# Patient Record
Sex: FEMALE | Race: BLACK OR AFRICAN AMERICAN | NOT HISPANIC OR LATINO | URBAN - METROPOLITAN AREA
[De-identification: names, ages, dates, MRNs, and addresses within clinical notes are randomized per-mention and may not be internally consistent; named-entity substitution may affect disease eponyms.]

---

## 2023-04-04 ENCOUNTER — AMBULATORY SURGICAL CENTER (OUTPATIENT)
Dept: URBAN - METROPOLITAN AREA SURGERY 3 | Facility: SURGERY | Age: 68
End: 2023-04-04
Payer: COMMERCIAL

## 2023-04-04 ENCOUNTER — OFFICE (OUTPATIENT)
Dept: URBAN - METROPOLITAN AREA PATHOLOGY 12 | Facility: PATHOLOGY | Age: 68
End: 2023-04-04
Payer: COMMERCIAL

## 2023-04-04 VITALS
OXYGEN SATURATION: 98 % | SYSTOLIC BLOOD PRESSURE: 148 MMHG | HEART RATE: 69 BPM | DIASTOLIC BLOOD PRESSURE: 74 MMHG | OXYGEN SATURATION: 96 % | HEIGHT: 69 IN | DIASTOLIC BLOOD PRESSURE: 70 MMHG | OXYGEN SATURATION: 96 % | RESPIRATION RATE: 18 BRPM | RESPIRATION RATE: 20 BRPM | RESPIRATION RATE: 22 BRPM | OXYGEN SATURATION: 99 % | SYSTOLIC BLOOD PRESSURE: 144 MMHG | SYSTOLIC BLOOD PRESSURE: 144 MMHG | DIASTOLIC BLOOD PRESSURE: 71 MMHG | TEMPERATURE: 97.5 F | SYSTOLIC BLOOD PRESSURE: 139 MMHG | DIASTOLIC BLOOD PRESSURE: 77 MMHG | DIASTOLIC BLOOD PRESSURE: 74 MMHG | HEART RATE: 69 BPM | WEIGHT: 182 LBS | DIASTOLIC BLOOD PRESSURE: 71 MMHG | DIASTOLIC BLOOD PRESSURE: 77 MMHG | SYSTOLIC BLOOD PRESSURE: 132 MMHG | SYSTOLIC BLOOD PRESSURE: 132 MMHG | TEMPERATURE: 98.5 F | SYSTOLIC BLOOD PRESSURE: 144 MMHG | SYSTOLIC BLOOD PRESSURE: 156 MMHG | OXYGEN SATURATION: 98 % | WEIGHT: 182 LBS | RESPIRATION RATE: 19 BRPM | RESPIRATION RATE: 22 BRPM | HEART RATE: 59 BPM | OXYGEN SATURATION: 97 % | HEART RATE: 72 BPM | HEART RATE: 59 BPM | SYSTOLIC BLOOD PRESSURE: 132 MMHG | DIASTOLIC BLOOD PRESSURE: 71 MMHG | HEART RATE: 72 BPM | HEART RATE: 55 BPM | DIASTOLIC BLOOD PRESSURE: 52 MMHG | DIASTOLIC BLOOD PRESSURE: 52 MMHG | RESPIRATION RATE: 19 BRPM | DIASTOLIC BLOOD PRESSURE: 74 MMHG | SYSTOLIC BLOOD PRESSURE: 156 MMHG | OXYGEN SATURATION: 97 % | HEART RATE: 56 BPM | HEART RATE: 69 BPM | OXYGEN SATURATION: 97 % | HEART RATE: 72 BPM | OXYGEN SATURATION: 98 % | DIASTOLIC BLOOD PRESSURE: 70 MMHG | TEMPERATURE: 97.5 F | RESPIRATION RATE: 19 BRPM | RESPIRATION RATE: 18 BRPM | TEMPERATURE: 98.5 F | TEMPERATURE: 97.5 F | DIASTOLIC BLOOD PRESSURE: 52 MMHG | RESPIRATION RATE: 20 BRPM | TEMPERATURE: 98.5 F | RESPIRATION RATE: 18 BRPM | SYSTOLIC BLOOD PRESSURE: 156 MMHG | OXYGEN SATURATION: 99 % | HEART RATE: 59 BPM | RESPIRATION RATE: 22 BRPM | DIASTOLIC BLOOD PRESSURE: 77 MMHG | SYSTOLIC BLOOD PRESSURE: 148 MMHG | OXYGEN SATURATION: 99 % | HEIGHT: 69 IN | SYSTOLIC BLOOD PRESSURE: 148 MMHG | OXYGEN SATURATION: 96 % | WEIGHT: 182 LBS | SYSTOLIC BLOOD PRESSURE: 139 MMHG | HEART RATE: 55 BPM | HEART RATE: 55 BPM | HEIGHT: 69 IN | DIASTOLIC BLOOD PRESSURE: 70 MMHG | HEART RATE: 56 BPM | RESPIRATION RATE: 20 BRPM | HEART RATE: 56 BPM | SYSTOLIC BLOOD PRESSURE: 139 MMHG

## 2023-04-04 DIAGNOSIS — K57.30 DIVERTICULOSIS OF LARGE INTESTINE WITHOUT PERFORATION OR ABS: ICD-10-CM

## 2023-04-04 DIAGNOSIS — Z12.11 ENCOUNTER FOR SCREENING FOR MALIGNANT NEOPLASM OF COLON: ICD-10-CM

## 2023-04-04 DIAGNOSIS — K63.5 POLYP OF COLON: ICD-10-CM

## 2023-04-04 DIAGNOSIS — D12.0 BENIGN NEOPLASM OF CECUM: ICD-10-CM

## 2023-04-04 PROCEDURE — 88305 TISSUE EXAM BY PATHOLOGIST: CPT | Performed by: PATHOLOGY

## 2023-04-04 PROCEDURE — 45385 COLONOSCOPY W/LESION REMOVAL: CPT | Mod: PT | Performed by: STUDENT IN AN ORGANIZED HEALTH CARE EDUCATION/TRAINING PROGRAM

## 2023-04-04 NOTE — SERVICEHPINOTES
Ms. Lina Saul is a 68 yo female referred for routine screening colonoscopy. Last colon was over 10 years ago, no polyps found at that time per the patient.  Patient denies prior positive Stool DNA or Hemoccult tests.   Patient is not currently taking Coumadin, Plavix or any other blood thinners. Patient denies history of heart or lung problems. Patient denies history of chest pain or shortness of breath. No history of kidney disease, renal dialysis, bleeding problems, or liver disease. Denies any hospitalizations or operations in the last six months. No history of rectal bleeding. Patient denies history of  diabetes mellitus. Patient denies any other GI symptoms.
br
br   Pt feels well, no bleeding, no blood thinners, +previous partial hysterectomy and ectopic pregnancy, no FHx of CRC.

## 2023-04-04 NOTE — SERVICEHPINOTES
Ms. Lina Saul is a 66 yo female referred for routine screening colonoscopy. Last colon was over 10 years ago, no polyps found at that time per the patient.  Patient denies prior positive Stool DNA or Hemoccult tests.   Patient is not currently taking Coumadin, Plavix or any other blood thinners. Patient denies history of heart or lung problems. Patient denies history of chest pain or shortness of breath. No history of kidney disease, renal dialysis, bleeding problems, or liver disease. Denies any hospitalizations or operations in the last six months. No history of rectal bleeding. Patient denies history of  diabetes mellitus. Patient denies any other GI symptoms.
br
br   Pt feels well, no bleeding, no blood thinners, +previous partial hysterectomy and ectopic pregnancy, no FHx of CRC.